# Patient Record
Sex: FEMALE | HISPANIC OR LATINO
[De-identification: names, ages, dates, MRNs, and addresses within clinical notes are randomized per-mention and may not be internally consistent; named-entity substitution may affect disease eponyms.]

---

## 2022-11-28 PROBLEM — Z00.00 ENCOUNTER FOR PREVENTIVE HEALTH EXAMINATION: Status: ACTIVE | Noted: 2022-11-28

## 2023-02-16 ENCOUNTER — LABORATORY RESULT (OUTPATIENT)
Age: 63
End: 2023-02-16

## 2023-02-17 ENCOUNTER — APPOINTMENT (OUTPATIENT)
Dept: UROGYNECOLOGY | Facility: CLINIC | Age: 63
End: 2023-02-17
Payer: COMMERCIAL

## 2023-02-17 VITALS
DIASTOLIC BLOOD PRESSURE: 64 MMHG | HEIGHT: 57 IN | WEIGHT: 139 LBS | SYSTOLIC BLOOD PRESSURE: 146 MMHG | BODY MASS INDEX: 29.99 KG/M2 | HEART RATE: 73 BPM

## 2023-02-17 DIAGNOSIS — Z82.49 FAMILY HISTORY OF ISCHEMIC HEART DISEASE AND OTHER DISEASES OF THE CIRCULATORY SYSTEM: ICD-10-CM

## 2023-02-17 DIAGNOSIS — Z92.3 PERSONAL HISTORY OF IRRADIATION: ICD-10-CM

## 2023-02-17 DIAGNOSIS — Z80.3 FAMILY HISTORY OF MALIGNANT NEOPLASM OF BREAST: ICD-10-CM

## 2023-02-17 DIAGNOSIS — Z85.3 PERSONAL HISTORY OF MALIGNANT NEOPLASM OF BREAST: ICD-10-CM

## 2023-02-17 DIAGNOSIS — Z78.9 OTHER SPECIFIED HEALTH STATUS: ICD-10-CM

## 2023-02-17 DIAGNOSIS — N81.4 UTEROVAGINAL PROLAPSE, UNSPECIFIED: ICD-10-CM

## 2023-02-17 PROCEDURE — 99205 OFFICE O/P NEW HI 60 MIN: CPT | Mod: 25

## 2023-02-17 PROCEDURE — 51701 INSERT BLADDER CATHETER: CPT

## 2023-02-17 RX ORDER — ASPIRIN 81 MG/1
81 TABLET ORAL
Refills: 0 | Status: ACTIVE | COMMUNITY

## 2023-02-17 RX ORDER — LETROZOLE TABLETS 2.5 MG/1
2.5 TABLET, FILM COATED ORAL
Refills: 0 | Status: ACTIVE | COMMUNITY

## 2023-02-17 RX ORDER — ATORVASTATIN CALCIUM 10 MG/1
10 TABLET, FILM COATED ORAL
Refills: 0 | Status: ACTIVE | COMMUNITY

## 2023-02-21 LAB — URINE CULTURE <10: NORMAL

## 2023-02-23 ENCOUNTER — NON-APPOINTMENT (OUTPATIENT)
Age: 63
End: 2023-02-23

## 2023-02-23 NOTE — DISCUSSION/SUMMARY
[FreeTextEntry1] : \par Uterovaginal prolapse complete-\par The patient was counseled regarding the possible natural progression of prolapse and the clinical consequences of worsening prolapse. The stage and the location of the prolapse was reviewed with the patient. She was counseled regarding the management strategies including observation, pessary placement and surgery (robotic hysterectomy/BS0/sacrocolpopexy). The patient voiced understanding and agrees with the surgical plan. She will be scheduled for preoperative urodynamics and a surgical counseling visit.\par \par

## 2023-02-23 NOTE — COUNSELING
[FreeTextEntry1] : \par We will notify you of the urine results if they are abnormal\par \par Schedule bladder function testing with my PA, Taya (UDS with reduction). Please come with a full bladder. Please do not press on your belly or near the vagina or lean forward when you try to urinate during the test.\par \par Please call the office if you feel like you have an infection because we can not do the bladder function testing in the setting of an infection\par \par Please schedule surgical counseling visit with Dr Tucker

## 2023-02-23 NOTE — HISTORY OF PRESENT ILLNESS
[FreeTextEntry1] : \par Pt with pelvic floor dysfunction here for urogynecologic evaluation. She describes: \par \par Chief PFD: vaginal bulge\par \par Records reviewed:\par 5/24/2022 pap negative HPV negative\par \par Pelvic organ prolapse: +bulge for several years, worsening, no prior treatment, no splinting\par Stress urinary incontinence: no\par Overactive bladder syndrome: no frequency, no urgency, no leakage\par Voiding dysfunction: no Incomplete bladder emptying, no hesitancy \par Lower urinary tract/vaginal symptoms: no UTIs per year, no hematuria, no dysuria, no bladder pain \par Fecal incontinence: no\par Defecatory dysfunction: sausage\par Sexual dysfunction: not active\par Pelvic pain: denies\par Vaginal dryness: no,  hx of breast cancer\par \par Her pelvic floor symptoms are significantly bothersome and negatively impacting her quality of life. \par \par

## 2023-02-23 NOTE — PHYSICAL EXAM
[Chaperone Present] : A chaperone was present in the examining room during all aspects of the physical examination [FreeTextEntry1] : Void: 70 cc\par PVR: 40 cc\par Urethra was prepped in sterile fashion and then a sterile non- indwelling catheter (14F) was used by me to drain the bladder. The patient tolerated the procedure well.\par Indication: uterovaginal prolapse incomplete\par \par GH:  6.5  pB: 3  TVL: 9  C: +9 D: -2.5  Aa: +3  Ba: +9  Ap: +3 Bp: +9\par \par No abdominal incisions noted\par normal perineal sensation\par normal perineal reflexes\par negative cough stress test\par + atrophy\par no urethral tenderness\par no bladder tenderness\par no cervical tenderness\par 1/5 [place]\par

## 2023-03-28 ENCOUNTER — APPOINTMENT (OUTPATIENT)
Dept: UROGYNECOLOGY | Facility: CLINIC | Age: 63
End: 2023-03-28

## 2023-03-30 ENCOUNTER — APPOINTMENT (OUTPATIENT)
Dept: UROGYNECOLOGY | Facility: CLINIC | Age: 63
End: 2023-03-30

## 2023-04-25 ENCOUNTER — APPOINTMENT (OUTPATIENT)
Dept: UROGYNECOLOGY | Facility: CLINIC | Age: 63
End: 2023-04-25
Payer: COMMERCIAL

## 2023-04-25 VITALS
BODY MASS INDEX: 29.99 KG/M2 | WEIGHT: 139 LBS | SYSTOLIC BLOOD PRESSURE: 137 MMHG | HEIGHT: 57 IN | HEART RATE: 54 BPM | DIASTOLIC BLOOD PRESSURE: 83 MMHG

## 2023-04-25 PROCEDURE — 57160 INSERT PESSARY/OTHER DEVICE: CPT

## 2023-04-25 PROCEDURE — A4562: CPT

## 2023-05-01 NOTE — HISTORY OF PRESENT ILLNESS
[FreeTextEntry1] : Patient is here for pessary fitting. GH:6.5  TVL:9\par Last seen 2/17/23 as a new pt with vaginal bulge. \par \par Records reviewed:\par 5/24/2022 pap negative HPV negative\par hx of breast cancer\par \par GH: 6.5 pB: 3 TVL: 9 C: +9 D: -2.5 Aa: +3 Ba: +9 Ap: +3 Bp: +9\par \par \par Denies stress incontinence\par Sexually active sometimes\par No history of incomplete bladder emptying without reduction. PVR: 40cc\par \par \par No complaints today. Pt was initially interested in surgical management, but changed her mind (worried about having surgery) and wants to try pessary management first. \par

## 2023-05-01 NOTE — DISCUSSION/SUMMARY
[FreeTextEntry1] : Complete Uterovaginal Prolapse:\par Ring and support # 6 placed without difficulty. Remained in place with Valsalva, coughing, and ambulating.\par [place] # 5 placed without difficulty. Remained in place with Valsalva, coughing, and ambulating.\par  \par Pt felt comfortable with both pessaries, Will try ring and support # 6 at this time, If it falls out, will use [place] # 5 (better support)\par All fitters removed. \par New pessary ring and support # 6 placed\par The patient tolerated all fittings well.\par Follow up in 3 weeks for pessary check.\par \par Microscopic Hematuria\par CT scan request and BMP given to pt\par She's scheduled for cysto 5/18\par

## 2023-05-01 NOTE — COUNSELING
[FreeTextEntry1] : Please call the office if you have any issues with vaginal pain, vaginal bleeding, difficulty urinating or having a bowel movement or if the pessary falls out so that we can arrange another size pessary.\par \par Please follow up for pessary maintenance in 3 weeks with GENIA Barr\amy

## 2023-05-02 ENCOUNTER — APPOINTMENT (OUTPATIENT)
Dept: UROGYNECOLOGY | Facility: CLINIC | Age: 63
End: 2023-05-02
Payer: COMMERCIAL

## 2023-05-02 VITALS
BODY MASS INDEX: 30.2 KG/M2 | HEIGHT: 57 IN | WEIGHT: 140 LBS | SYSTOLIC BLOOD PRESSURE: 154 MMHG | DIASTOLIC BLOOD PRESSURE: 85 MMHG | HEART RATE: 71 BPM

## 2023-05-02 DIAGNOSIS — N81.3 COMPLETE UTEROVAGINAL PROLAPSE: ICD-10-CM

## 2023-05-02 PROCEDURE — A4562: CPT

## 2023-05-02 PROCEDURE — 57160 INSERT PESSARY/OTHER DEVICE: CPT

## 2023-05-10 NOTE — DISCUSSION/SUMMARY
[FreeTextEntry1] : Incomplete Uterovaginal Prolapse:\par [place] # 5 placed without difficulty. Remained in place with Valsalva, coughing, and ambulating. \par Pessary rests at introitus but supports the prolapse well Pt is comfortable. \par Fitter removed, new pessary placed.\par The patient tolerated all fittings well.\par Follow up on 5/18 for cysto for microscopic hematuria, will do pessary check at that time.

## 2023-05-10 NOTE — COUNSELING
[FreeTextEntry1] : Please call the office if you have any issues with vaginal pain, vaginal bleeding, difficulty urinating or having a bowel movement or if the pessary falls out so that we can arrange another size pessary.\par \par Please follow up for your scheduled appointment for cystoscopy on 5/18/23. We will check the pessary at that time.

## 2023-05-10 NOTE — HISTORY OF PRESENT ILLNESS
[FreeTextEntry1] : Patient is here for pessary re-fitting. GH: 6.5  TVL: 9\par Last seen 4/25/23 for fitting for vaginal bulge. \par \par Records reviewed:\par 5/24/2022 pap negative HPV negative\par hx of breast cancer\par \par GH: 6.5 pB: 3 TVL: 9 C: +9 D: -2.5 Aa: +3 Ba: +9 Ap: +3 Bp: +9\par \par Denies stress incontinence\par Sexually active sometimes\par No history of incomplete bladder emptying without reduction. PVR: 40cc\par \par Fitting:\par Ring and support # 6, fell out at home with pushing for bowel movement\par \par No complaints today. States that she takes iron BID and that can cause constipation. Usually she has no problems with bowel movements. \par

## 2023-05-15 ENCOUNTER — NON-APPOINTMENT (OUTPATIENT)
Age: 63
End: 2023-05-15

## 2023-05-18 ENCOUNTER — APPOINTMENT (OUTPATIENT)
Dept: UROGYNECOLOGY | Facility: CLINIC | Age: 63
End: 2023-05-18

## 2023-05-23 ENCOUNTER — APPOINTMENT (OUTPATIENT)
Dept: UROGYNECOLOGY | Facility: CLINIC | Age: 63
End: 2023-05-23

## 2024-01-10 DIAGNOSIS — R31.29 OTHER MICROSCOPIC HEMATURIA: ICD-10-CM

## 2024-07-25 ENCOUNTER — APPOINTMENT (OUTPATIENT)
Dept: UROGYNECOLOGY | Facility: CLINIC | Age: 64
End: 2024-07-25

## 2024-07-25 ENCOUNTER — RESULT CHARGE (OUTPATIENT)
Age: 64
End: 2024-07-25

## 2024-07-25 ENCOUNTER — LABORATORY RESULT (OUTPATIENT)
Age: 64
End: 2024-07-25

## 2024-07-25 VITALS
HEART RATE: 64 BPM | BODY MASS INDEX: 29.99 KG/M2 | DIASTOLIC BLOOD PRESSURE: 69 MMHG | SYSTOLIC BLOOD PRESSURE: 116 MMHG | WEIGHT: 139 LBS | HEIGHT: 57 IN

## 2024-07-25 DIAGNOSIS — N81.3 COMPLETE UTEROVAGINAL PROLAPSE: ICD-10-CM

## 2024-07-25 DIAGNOSIS — R31.29 OTHER MICROSCOPIC HEMATURIA: ICD-10-CM

## 2024-07-25 PROCEDURE — 99205 OFFICE O/P NEW HI 60 MIN: CPT | Mod: 25

## 2024-07-25 PROCEDURE — 52000 CYSTOURETHROSCOPY: CPT

## 2024-07-29 LAB
BACTERIA UR CULT: NORMAL
BILIRUB UR QL STRIP: NORMAL
CLARITY UR: CLEAR
COLLECTION METHOD: NORMAL
GLUCOSE UR-MCNC: NORMAL
HCG UR QL: 0.2 EU/DL
HGB UR QL STRIP.AUTO: NORMAL
KETONES UR-MCNC: NORMAL
LEUKOCYTE ESTERASE UR QL STRIP: NORMAL
NITRITE UR QL STRIP: NORMAL
PH UR STRIP: 6
PROT UR STRIP-MCNC: NORMAL
SP GR UR STRIP: 1.02

## 2024-11-11 ENCOUNTER — APPOINTMENT (OUTPATIENT)
Dept: UROGYNECOLOGY | Facility: CLINIC | Age: 64
End: 2024-11-11

## 2024-11-14 ENCOUNTER — APPOINTMENT (OUTPATIENT)
Dept: UROGYNECOLOGY | Facility: CLINIC | Age: 64
End: 2024-11-14